# Patient Record
Sex: MALE | Race: WHITE
[De-identification: names, ages, dates, MRNs, and addresses within clinical notes are randomized per-mention and may not be internally consistent; named-entity substitution may affect disease eponyms.]

---

## 2019-01-21 ENCOUNTER — HOSPITAL ENCOUNTER (OUTPATIENT)
Dept: HOSPITAL 53 - M LRY | Age: 29
End: 2019-01-21
Attending: NURSE PRACTITIONER
Payer: COMMERCIAL

## 2019-01-21 DIAGNOSIS — S69.91XA: Primary | ICD-10-CM

## 2019-01-21 NOTE — REP
RIGHT HAND, FOUR VIEWS:

 

HISTORY: Injury.

 

There is a nondisplaced fracture of the proximal phalange of the second digit.

There is no dislocation. The joint spaces are normal in appearance.

 

IMPRESSION:Fracture of the 2nd proximal phalange.

 

 

Electronically Signed by

Huber Dockery MD 01/21/2019 04:33 P